# Patient Record
Sex: FEMALE | Race: WHITE
[De-identification: names, ages, dates, MRNs, and addresses within clinical notes are randomized per-mention and may not be internally consistent; named-entity substitution may affect disease eponyms.]

---

## 2017-03-06 ENCOUNTER — HOSPITAL ENCOUNTER (OUTPATIENT)
Dept: HOSPITAL 58 - LAB | Age: 72
Discharge: HOME | End: 2017-03-06
Attending: INTERNAL MEDICINE

## 2017-03-06 VITALS — BODY MASS INDEX: 23.3 KG/M2

## 2017-03-06 DIAGNOSIS — E89.0: Primary | ICD-10-CM

## 2017-03-06 DIAGNOSIS — E89.2: ICD-10-CM

## 2017-03-06 LAB
ALBUMIN SERPL-MCNC: 3.2 G/DL (ref 3.4–5)
ALBUMIN/GLOB SERPL: 0.82 {RATIO}
ALP SERPL-CCNC: 58 U/L (ref 53–141)
ALT SERPL-CCNC: 11 U/L (ref 12–78)
ANION GAP SERPL CALC-SCNC: 17.1 MMOL/L
AST SERPL-CCNC: 14 U/L (ref 15–37)
BILIRUB SERPL-MCNC: 0.27 MG/DL (ref 0–1.2)
BUN SERPL-MCNC: 14 MG/DL (ref 7–18)
BUN/CREAT SERPL: 17.28
CALCIUM SERPL-MCNC: 8.1 MG/DL (ref 8.2–10.2)
CHLORIDE SERPL-SCNC: 97 MMOL/L (ref 98–107)
CO2 BLD-SCNC: 28 MMOL/L (ref 23–31)
CREAT SERPL-MCNC: 0.81 MG/DL (ref 0.6–1.3)
GFR SERPLBLD BASED ON 1.73 SQ M-ARVRAT: 70 ML/MIN
GLOBULIN SER CALC-MCNC: 3.9 G/L
GLUCOSE SERPL-MCNC: 90 MG/DL (ref 82–115)
POTASSIUM SERPL-SCNC: 4.1 MMOL/L (ref 3.5–5.1)
PROT SERPL-MCNC: 7.1 G/DL (ref 5.8–8.1)
SODIUM SERPL-SCNC: 138 MMOL/L (ref 136–145)
VITAMIN D25: 19 NG/ML (ref 20–50)

## 2017-03-06 PROCEDURE — 82306 VITAMIN D 25 HYDROXY: CPT

## 2017-03-06 PROCEDURE — 84443 ASSAY THYROID STIM HORMONE: CPT

## 2017-03-06 PROCEDURE — 36415 COLL VENOUS BLD VENIPUNCTURE: CPT

## 2017-03-06 PROCEDURE — 80053 COMPREHEN METABOLIC PANEL: CPT

## 2017-04-13 ENCOUNTER — HOSPITAL ENCOUNTER (OUTPATIENT)
Dept: HOSPITAL 58 - RAD | Age: 72
Discharge: HOME | End: 2017-04-13
Attending: INTERNAL MEDICINE

## 2017-04-13 VITALS — BODY MASS INDEX: 23.3 KG/M2

## 2017-04-13 DIAGNOSIS — R14.0: Primary | ICD-10-CM

## 2017-04-13 NOTE — CT
EXAM:  CT of the abdomen pelvis without contrast 

  

History:  Abdominal distension. 

  

Comparison:  CT abdomen pelvis 03/26/2015 

  

Technique:  Multiplanar CT images through the abdomen pelvis were obtained without the administratio
n of IV contrast 

  

Findings:  Lung bases are free of consolidation.  No acute osseous abnormalities.  Osteopenia. 

  

Status post cholecystectomy.  Stable small benign hepatic cysts.  Atherosclerotic vascular calcifica
tions.  Calcified granulomas seen within the spleen.  No peripancreatic inflammation.  Adrenal gland
s are unremarkable.  No renal stones and no hydronephrosis.  No ureteral calculi.  No free air and n
o ascites.  No bowel obstruction.  Bladder is not well distended but the wall is not thickened.  The
 appendix is not dilated or inflamed.  Colonic diverticulosis.  No perirectal inflammation.  The lef
t side of the colon is not well distended but there is no pericolonic inflammation.  Atrophic uterus
. 

  

Impression: 

1.  No acute intra-abdominal or pelvic process. 

2.  Colonic diverticulosis. 

3.  Atherosclerotic vascular disease. 

4.  Stable small benign hepatic cysts.

## 2017-04-17 ENCOUNTER — HOSPITAL ENCOUNTER (OUTPATIENT)
Dept: HOSPITAL 58 - RAD | Age: 72
Discharge: HOME | End: 2017-04-17
Attending: INTERNAL MEDICINE
Payer: COMMERCIAL

## 2017-04-17 VITALS — BODY MASS INDEX: 23.3 KG/M2

## 2017-04-17 DIAGNOSIS — Z12.31: Primary | ICD-10-CM

## 2017-04-19 NOTE — MAMMO
EXAM:  Bilateral digital screening mammogram 

  

History:  Screening 

  

Comparison:  Bilateral mammogram 03/23/2015 

  

Findings:  MLO and CC views of bilateral breasts demonstrate scattered fibroglandular breast parench
yma.  Benign bilateral breast calcifications.  There are no dominant masses, no suspicious microcalc
ifications and no architectural distortions 

  

Impression:  Benign stable mammogram.  Recommend followup routine screening mammography in 1 year. 

  

BIRADS 2

## 2017-05-16 ENCOUNTER — HOSPITAL ENCOUNTER (OUTPATIENT)
Dept: HOSPITAL 58 - CAR | Age: 72
Discharge: HOME | End: 2017-05-16
Attending: INTERNAL MEDICINE
Payer: COMMERCIAL

## 2017-05-16 VITALS — BODY MASS INDEX: 23.3 KG/M2

## 2017-05-16 DIAGNOSIS — J44.9: ICD-10-CM

## 2017-05-16 DIAGNOSIS — R06.02: Primary | ICD-10-CM

## 2017-05-16 DIAGNOSIS — Z87.01: ICD-10-CM

## 2017-06-02 ENCOUNTER — OFFICE VISIT (OUTPATIENT)
Dept: GASTROENTEROLOGY | Facility: CLINIC | Age: 72
End: 2017-06-02

## 2017-06-02 VITALS
SYSTOLIC BLOOD PRESSURE: 142 MMHG | HEART RATE: 106 BPM | BODY MASS INDEX: 23.63 KG/M2 | TEMPERATURE: 96.5 F | WEIGHT: 147 LBS | OXYGEN SATURATION: 98 % | DIASTOLIC BLOOD PRESSURE: 78 MMHG | HEIGHT: 66 IN

## 2017-06-02 DIAGNOSIS — Z72.0 TOBACCO USE: ICD-10-CM

## 2017-06-02 DIAGNOSIS — I10 ESSENTIAL HYPERTENSION: ICD-10-CM

## 2017-06-02 DIAGNOSIS — Z83.71 FAMILY HX COLONIC POLYPS: ICD-10-CM

## 2017-06-02 DIAGNOSIS — K62.5 BRBPR (BRIGHT RED BLOOD PER RECTUM): ICD-10-CM

## 2017-06-02 DIAGNOSIS — Z86.010 HX OF COLONIC POLYP: Primary | ICD-10-CM

## 2017-06-02 PROCEDURE — 99213 OFFICE O/P EST LOW 20 MIN: CPT | Performed by: NURSE PRACTITIONER

## 2017-06-02 RX ORDER — OMEPRAZOLE 20 MG/1
20 CAPSULE, DELAYED RELEASE ORAL
COMMUNITY

## 2017-06-02 RX ORDER — ALPRAZOLAM 1 MG/1
1 TABLET ORAL 2 TIMES DAILY PRN
COMMUNITY

## 2017-06-02 RX ORDER — AMLODIPINE BESYLATE 10 MG/1
10 TABLET ORAL
COMMUNITY

## 2017-06-02 RX ORDER — LOSARTAN POTASSIUM AND HYDROCHLOROTHIAZIDE 12.5; 5 MG/1; MG/1
TABLET ORAL
COMMUNITY

## 2017-06-02 RX ORDER — ERGOCALCIFEROL 1.25 MG/1
50000 CAPSULE ORAL WEEKLY
COMMUNITY

## 2017-06-02 RX ORDER — CARVEDILOL 6.25 MG/1
6.25 TABLET ORAL
COMMUNITY

## 2017-06-02 RX ORDER — LEVOTHYROXINE SODIUM 112 UG/1
TABLET ORAL
COMMUNITY

## 2017-06-02 NOTE — PROGRESS NOTES
[facility]    Primary Physician Jose Luis Hirsch MD    6/2/2017    Elle Gómez   1945      Chief Complaint   Patient presents with   • Colon Cancer Screening     Patient is here today for colon recall.       Subjective     HPI    Elle Gómez is a 72 y.o. female with brb pr 1 mo ago.  Had 2 episodes, small amount noted. Has bm qd.         Past Medical History:   Diagnosis Date   • Anemia    • Anxiety    • Arthritis    • Colitis    • Colon polyp    • COPD (chronic obstructive pulmonary disease)    • Disease of thyroid gland    • GI bleed    • Hematochezia    • Hyperlipidemia    • Hypertension    • Peptic ulcer    • Pneumonia        Past Surgical History:   Procedure Laterality Date   • CHOLECYSTECTOMY     • COLONOSCOPY  04/15/2014   • PARATHYROIDECTOMY     • ROTATOR CUFF REPAIR     • THYROIDECTOMY         Outpatient Prescriptions Marked as Taking for the 6/2/17 encounter (Office Visit) with LAISHA Sherman   Medication Sig Dispense Refill   • ALPRAZolam (XANAX) 1 MG tablet Take 1 mg by mouth 2 (Two) Times a Day As Needed for Anxiety.     • amLODIPine (NORVASC) 10 MG tablet Take 10 mg by mouth.     • carvedilol (COREG) 6.25 MG tablet Take 6.25 mg by mouth.     • docusate sodium (COLACE) 50 MG capsule Take  by mouth 2 (Two) Times a Day.     • levothyroxine (SYNTHROID, LEVOTHROID) 112 MCG tablet Take  by mouth.     • losartan-hydrochlorothiazide (HYZAAR) 50-12.5 MG per tablet Take  by mouth.     • omeprazole (priLOSEC) 20 MG capsule Take 20 mg by mouth.     • vitamin D (ERGOCALCIFEROL) 40291 UNITS capsule capsule Take 50,000 Units by mouth 1 (One) Time Per Week.         No Known Allergies    Social History     Social History   • Marital status:      Spouse name: N/A   • Number of children: N/A   • Years of education: N/A     Occupational History   • Not on file.     Social History Main Topics   • Smoking status: Current Every Day Smoker   • Smokeless tobacco: Never  Used   • Alcohol use Yes   • Drug use: Not on file   • Sexual activity: Not on file     Other Topics Concern   • Not on file     Social History Narrative       Family History   Problem Relation Age of Onset   • Colon polyps Mother      doesnt know age   • Colon polyps Sister 68   • Colon cancer Neg Hx        Review of Systems   Constitutional: Negative for appetite change, chills, fatigue, fever and unexpected weight change.   HENT: Negative for sore throat and trouble swallowing.    Respiratory: Positive for shortness of breath (occasional). Negative for cough, chest tightness and wheezing.    Cardiovascular: Negative for chest pain and palpitations.   Gastrointestinal: Positive for anal bleeding and blood in stool. Negative for abdominal distention, abdominal pain, constipation, diarrhea, nausea, rectal pain and vomiting.        As mentioned in hpi   Genitourinary: Negative for difficulty urinating, dysuria and hematuria.   Musculoskeletal: Negative for arthralgias and back pain.   Skin: Negative for color change and rash.   Neurological: Negative for dizziness, tremors, seizures, syncope, light-headedness and headaches.   Hematological: Negative for adenopathy. Does not bruise/bleed easily.   Psychiatric/Behavioral: Negative for confusion. The patient is not nervous/anxious.        Objective     Vitals:    06/02/17 1035   BP: 142/78   Pulse: 106   Temp: 96.5 °F (35.8 °C)   SpO2: 98%     Last 2 weights    06/02/17  1035   Weight: 147 lb (66.7 kg)     Body mass index is 23.73 kg/(m^2).    Physical Exam   Constitutional: She is oriented to person, place, and time. She appears well-developed and well-nourished. No distress.   HENT:   Head: Normocephalic and atraumatic.   Mouth/Throat: Oropharynx is clear and moist.   Eyes: Pupils are equal, round, and reactive to light. No scleral icterus.   Neck: Normal range of motion. Neck supple. No JVD present. No tracheal deviation present.   Cardiovascular: Normal rate,  regular rhythm and intact distal pulses.  Exam reveals no gallop and no friction rub.    Murmur (systolic) heard.  Pulmonary/Chest: Effort normal and breath sounds normal. No stridor. No respiratory distress. She has no wheezes. She has no rales.   Abdominal: Soft. Bowel sounds are normal. She exhibits no distension and no mass. There is no tenderness. There is no rebound and no guarding.   Musculoskeletal: Normal range of motion. She exhibits no edema or deformity.   Neurological: She is alert and oriented to person, place, and time.   Skin: Skin is warm and dry. No rash noted.   Psychiatric: She has a normal mood and affect. Her behavior is normal.   Vitals reviewed.      Imaging Results (most recent)     None          Assessment/Plan     Elle was seen today for colon cancer screening.    Diagnoses and all orders for this visit:    Hx of colonic polyp  -     External Facility Surgical/Procedural Request    BRBPR (bright red blood per rectum)  -     External Facility Surgical/Procedural Request    Family hx colonic polyps  -     External Facility Surgical/Procedural Request    Essential hypertension    Tobacco use    Other orders  -     polyethylene glycol (GOLYTELY) 236 G solution; Take 4,000 mL by mouth 1 (One) Time for 1 dose. Take as directed per instruction sheet.    plan for colonoscopy. Er if  Rectal bleeding.  To take b/p or heart med am of procedure if that is when she normally takes. Encouraged smoking cessation. Will request echo report from morris taylor.      Colonoscopy   All risks, benefits, alternatives, and indications of colonoscopy procedure have been discussed with the patient. Risks to include perforation of the colon requiring possible surgery or colostomy, risk of bleeding from biopsies or removal of colon tissue, possibility of missing a colon polyp or cancer, or adverse drug reaction.  Benefits to include the diagnosis and management of disease of the colon and rectum. Alternatives to include  barium enema, radiographic evaluation, lab testing or no intervention. Pt verbalizes understanding and agrees.     There are no Patient Instructions on file for this visit.    LAISHA Isbell      EMR Dragon/transcription disclaimer:  Much of this encounter note is electronic transcription/translation of spoken language to printed text.  The electronic translation of spoken language may be erroneous, or at times, nonsensical words or phrases may be inadvertently transcribed.  Although I have reviewed the note for such errors, some may still exist.

## 2017-07-11 ENCOUNTER — HOSPITAL ENCOUNTER (OUTPATIENT)
Dept: HOSPITAL 58 - SURG | Age: 72
Discharge: HOME | End: 2017-07-11
Attending: INTERNAL MEDICINE

## 2017-07-11 ENCOUNTER — OUTSIDE FACILITY SERVICE (OUTPATIENT)
Dept: GASTROENTEROLOGY | Facility: CLINIC | Age: 72
End: 2017-07-11

## 2017-07-11 VITALS — TEMPERATURE: 98.5 F | DIASTOLIC BLOOD PRESSURE: 70 MMHG | SYSTOLIC BLOOD PRESSURE: 146 MMHG

## 2017-07-11 VITALS — BODY MASS INDEX: 23.3 KG/M2

## 2017-07-11 DIAGNOSIS — K63.5: ICD-10-CM

## 2017-07-11 DIAGNOSIS — Z86.010: ICD-10-CM

## 2017-07-11 DIAGNOSIS — Z09: Primary | ICD-10-CM

## 2017-07-11 PROCEDURE — 88305 TISSUE EXAM BY PATHOLOGIST: CPT | Performed by: INTERNAL MEDICINE

## 2017-07-11 PROCEDURE — 45385 COLONOSCOPY W/LESION REMOVAL: CPT | Performed by: INTERNAL MEDICINE

## 2017-07-12 ENCOUNTER — LAB REQUISITION (OUTPATIENT)
Dept: LAB | Facility: HOSPITAL | Age: 72
End: 2017-07-12

## 2017-07-12 DIAGNOSIS — Z00.00 ENCOUNTER FOR GENERAL ADULT MEDICAL EXAMINATION WITHOUT ABNORMAL FINDINGS: ICD-10-CM

## 2017-07-12 NOTE — OP
INDICATIONS FOR PROCEDURE: 72 year old female presents for colonoscopy exam. 
She had abnormal polyps removed three years ago approximately. She presents for 
her followup surveillance exam. 



MEDICATIONS:  SEE ANESTHESIA NOTES.



PROCEDURE:  COLONOSCOPY, SNARE POLYPECTOMY.



REPORT:  The risks, benefits, alternatives and limitations were discussed in 
detail with the patient.  Informed consent was obtained.



After adequate sedation was achieved, a digital rectal exam revealed good tone, 
no masses.  The colonoscope was introduced into the rectum and advanced under 
direct visual guidance to the cecum.  The cecum was identified by the 
appendiceal orifice and IC valve. The then slowly withdrew the scope in a 
circumferential manner and examined the mucosa quite carefully. I looked on the 
proximal and distal side of the folds and flexures as best as possible. I sent 
the retroflex scope in the right and left colon in the increase visualization. 
Colonic mucosa is unremarkable all the way down to the rectum here there is a 5-
6mm sessile polyp. I removed this by snare technique. No other abnormalities 
were noted. The prep was adequate and the withdraw time was 9 minute and 20 
seconds. The patient tolerated the procedure well with stable vital signs and 
pulse oximetry throughout.



IMPRESSION:

1. Small rectal polyp removed



RECOMMENDATIONS: 

1. High fiber diet

2. Office visit as needed

3. Await polyp pathology and if everything is benign as expected I recommend 
repeat surveillance exam again in five year or sooner if there are any signs or 
symptoms to indicate otherwise. 



CC: Dr. Jose WILLIAM

## 2017-07-13 LAB
LAB AP CASE REPORT: NORMAL
LAB AP CLINICAL INFORMATION: NORMAL
Lab: NORMAL
PATH REPORT.FINAL DX SPEC: NORMAL
PATH REPORT.GROSS SPEC: NORMAL

## 2017-08-01 ENCOUNTER — TRANSCRIBE ORDERS (OUTPATIENT)
Dept: ADMINISTRATIVE | Facility: HOSPITAL | Age: 72
End: 2017-08-01

## 2017-08-01 DIAGNOSIS — N95.0 POST-MENOPAUSAL BLEEDING: Primary | ICD-10-CM

## 2017-08-01 PROCEDURE — G0123 SCREEN CERV/VAG THIN LAYER: HCPCS | Performed by: OBSTETRICS & GYNECOLOGY

## 2017-08-02 ENCOUNTER — LAB REQUISITION (OUTPATIENT)
Dept: LAB | Facility: HOSPITAL | Age: 72
End: 2017-08-02

## 2017-08-02 DIAGNOSIS — Z12.72 ENCOUNTER FOR SCREENING FOR MALIGNANT NEOPLASM OF VAGINA: ICD-10-CM

## 2017-08-03 ENCOUNTER — HOSPITAL ENCOUNTER (OUTPATIENT)
Dept: ULTRASOUND IMAGING | Facility: HOSPITAL | Age: 72
Discharge: HOME | End: 2017-08-03
Attending: OBSTETRICS & GYNECOLOGY | Admitting: OBSTETRICS & GYNECOLOGY

## 2017-08-03 DIAGNOSIS — N95.0 POST-MENOPAUSAL BLEEDING: ICD-10-CM

## 2017-08-03 LAB
GEN CATEG CVX/VAG CYTO-IMP: NORMAL
LAB AP CASE REPORT: NORMAL
LAB AP GYN ADDITIONAL INFORMATION: NORMAL
LAB AP GYN OTHER FINDINGS: NORMAL
Lab: NORMAL
PATH INTERP SPEC-IMP: NORMAL
STAT OF ADQ CVX/VAG CYTO-IMP: NORMAL

## 2017-08-03 PROCEDURE — 76830 TRANSVAGINAL US NON-OB: CPT

## 2017-09-05 ENCOUNTER — HOSPITAL ENCOUNTER (OUTPATIENT)
Dept: HOSPITAL 58 - LAB | Age: 72
Discharge: HOME | End: 2017-09-05
Attending: INTERNAL MEDICINE

## 2017-09-05 VITALS — BODY MASS INDEX: 23.3 KG/M2

## 2017-09-05 DIAGNOSIS — E89.0: ICD-10-CM

## 2017-09-05 DIAGNOSIS — E89.2: Primary | ICD-10-CM

## 2017-09-05 LAB
ALBUMIN SERPL-MCNC: 3.5 G/DL (ref 3.4–5)
ALBUMIN/GLOB SERPL: 1.17 {RATIO}
ALP SERPL-CCNC: 62 U/L (ref 53–141)
ALT SERPL-CCNC: 9 U/L (ref 12–78)
ANION GAP SERPL CALC-SCNC: 14.8 MMOL/L
AST SERPL-CCNC: 15 U/L (ref 15–37)
BILIRUB SERPL-MCNC: 0.23 MG/DL (ref 0–1.2)
BUN SERPL-MCNC: 10 MG/DL (ref 7–18)
BUN/CREAT SERPL: 12.04
CALCIUM SERPL-MCNC: 8.9 MG/DL (ref 8.2–10.2)
CHLORIDE SERPL-SCNC: 102 MMOL/L (ref 98–107)
CO2 BLD-SCNC: 28 MMOL/L (ref 23–31)
CREAT SERPL-MCNC: 0.83 MG/DL (ref 0.6–1.3)
GFR SERPLBLD BASED ON 1.73 SQ M-ARVRAT: 68 ML/MIN
GLOBULIN SER CALC-MCNC: 3 G/L
GLUCOSE SERPL-MCNC: 99 MG/DL (ref 82–115)
POTASSIUM SERPL-SCNC: 4.8 MMOL/L (ref 3.5–5.1)
PROT SERPL-MCNC: 6.5 G/DL (ref 5.8–8.1)
SODIUM SERPL-SCNC: 140 MMOL/L (ref 136–145)

## 2017-09-05 PROCEDURE — 84443 ASSAY THYROID STIM HORMONE: CPT

## 2017-09-05 PROCEDURE — 80053 COMPREHEN METABOLIC PANEL: CPT

## 2017-09-05 PROCEDURE — 36415 COLL VENOUS BLD VENIPUNCTURE: CPT

## 2018-03-23 ENCOUNTER — HOSPITAL ENCOUNTER (OUTPATIENT)
Dept: HOSPITAL 58 - LAB | Age: 73
Discharge: HOME | End: 2018-03-23
Attending: INTERNAL MEDICINE

## 2018-03-23 VITALS — BODY MASS INDEX: 23.3 KG/M2

## 2018-03-23 DIAGNOSIS — E55.9: Primary | ICD-10-CM

## 2018-03-23 DIAGNOSIS — D64.9: ICD-10-CM

## 2018-03-23 DIAGNOSIS — E03.9: ICD-10-CM

## 2018-03-23 DIAGNOSIS — E20.9: ICD-10-CM

## 2018-03-23 PROCEDURE — 36415 COLL VENOUS BLD VENIPUNCTURE: CPT

## 2018-03-23 PROCEDURE — 83970 ASSAY OF PARATHORMONE: CPT

## 2018-03-23 PROCEDURE — 85025 COMPLETE CBC W/AUTO DIFF WBC: CPT

## 2018-03-23 PROCEDURE — 84443 ASSAY THYROID STIM HORMONE: CPT

## 2018-03-23 PROCEDURE — 84100 ASSAY OF PHOSPHORUS: CPT

## 2018-03-23 PROCEDURE — 80053 COMPREHEN METABOLIC PANEL: CPT

## 2018-03-23 PROCEDURE — 82306 VITAMIN D 25 HYDROXY: CPT

## 2018-03-23 PROCEDURE — 84439 ASSAY OF FREE THYROXINE: CPT

## 2018-03-23 PROCEDURE — 83735 ASSAY OF MAGNESIUM: CPT

## 2018-06-01 ENCOUNTER — HOSPITAL ENCOUNTER (OUTPATIENT)
Dept: HOSPITAL 58 - RAD | Age: 73
Discharge: HOME | End: 2018-06-01
Attending: INTERNAL MEDICINE

## 2018-06-01 VITALS — BODY MASS INDEX: 23.3 KG/M2

## 2018-06-01 DIAGNOSIS — Z12.31: Primary | ICD-10-CM

## 2018-06-01 PROCEDURE — 77067 SCR MAMMO BI INCL CAD: CPT

## 2018-06-04 NOTE — MAMMO
EXAM:  Digital screening mammogram with tomosynthesis 

  

HISTORY:  Screening 

  

COMPARISON:  04/27/2017 

  

FINDINGS:  Digital  MLO and CC views of the right and left breast were performed. Tomosynthesis was p
erformed.  Computer aided detection utilized. There are scattered fibroglandular densities. Benign bi
lateral calcifications. There is no evidence for mass, asymmetry, distortion, or suspicious calcifica
tions in either breast. 

  

IMPRESSION: 

1.  No evidence of malignancy in the right or left breast. 

2.  Annual screening mammogram is recommended in one year. 

  

BIRADS category 2, benign

## 2018-09-12 ENCOUNTER — HOSPITAL ENCOUNTER (OUTPATIENT)
Dept: HOSPITAL 58 - RAD | Age: 73
Discharge: HOME | End: 2018-09-12
Attending: INTERNAL MEDICINE

## 2018-09-12 VITALS — BODY MASS INDEX: 23.3 KG/M2

## 2018-09-12 DIAGNOSIS — N28.89: Primary | ICD-10-CM

## 2018-09-12 PROCEDURE — 74178 CT ABD&PLV WO CNTR FLWD CNTR: CPT

## 2018-09-12 NOTE — CT
EXAM:  CT abdomen and pelvis without/with contrast 

  

HISTORY: Right renal cortical mass 

  

TECHNIQUE:  Multi-slice transaxial helical with coronal and sagittal reformed images 

  

CONTRAST: Intravenous Omnipaque-350, 100 mL 

  

COMPARISON: CT abdomen/pelvis from 04/13/2017 and CT chest from 09/06/2018 

  

FINDINGS:  There are tree-in-bud alveolar opacities in the lower lungs.  The heart size is normal.  N
o pericardial or pleural effusions are appreciated. 

  

A simple cyst is detected hepatic segment II.  A tiny simple cyst is detected in the right hepatic lo
be at its caudad aspect.  The gallbladder surgically absent without significant biliary dilatation.  
Pancreatic divisum is noted.  The adrenal glands have normal attenuation and morphology.  The spleen 
has normal size with multiple calcified granulomas. 

  

Small simple left renal cysts are detected.  A few tiny hypodensities in the right kidney are also no
north.  Some of these hypodensities are too small to characterize there is discoid most compatible with
 simple cysts.  There is a small exophytic mass at the right renal cortex lateral aspect measuring 8.
2 x 8.0 x 5.8 mm. This has enhancement with washout.  The ureters have normal caliber.  No nephrolith
iasis or ureterolithiasis are appreciated.  The bladder is empty and cannot be assessed. Uterus adnex
a are normal. 

  

Diverticula arise from large bowel without CT evidence of diverticulitis.  The abdominal aorta is ath
erosclerotic.  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
  The inferior mesenteric artery remains patent.  Solitary renal arteries perfuse the kidneys.  No re
nal artery aneurysms are appreciated.  No renal vein invasion is evident.  No lymphadenopathy or asci
john are detected. 

  

The bones are free of suspicious osteolytic or osteoblastic lesions. 

  

IMPRESSION: 

  

1.  Enhancing mass at the interpolar region of the right kidney laterally is suspicious for renal juan
l carcinoma.  Urology consultation recommended. 

2.  ASCVD. 

3.  Simple hepatic cysts. 

4.  Colonic diverticulosis without CT evidence of diverticulitis. 

5.  Pancreatic divisum. 

6.  Tree-in-bud alveolar opacities suggest pneumonia.

## 2018-10-01 ENCOUNTER — OFFICE VISIT (OUTPATIENT)
Dept: UROLOGY | Age: 73
End: 2018-10-01
Payer: MEDICARE

## 2018-10-01 VITALS — BODY MASS INDEX: 21.35 KG/M2 | TEMPERATURE: 98.7 F | HEIGHT: 67 IN | WEIGHT: 136 LBS

## 2018-10-01 DIAGNOSIS — N28.89 RIGHT RENAL MASS: Primary | ICD-10-CM

## 2018-10-01 LAB
BILIRUBIN, POC: 0
BLOOD URINE, POC: NORMAL
CLARITY, POC: CLEAR
COLOR, POC: YELLOW
GLUCOSE URINE, POC: NORMAL
KETONES, POC: NORMAL
LEUKOCYTE EST, POC: NORMAL
NITRITE, POC: NORMAL
PH, POC: 7.5
PROTEIN, POC: NORMAL
SPECIFIC GRAVITY, POC: 1.01
UROBILINOGEN, POC: 0.2

## 2018-10-01 PROCEDURE — G8427 DOCREV CUR MEDS BY ELIG CLIN: HCPCS | Performed by: UROLOGY

## 2018-10-01 PROCEDURE — G8420 CALC BMI NORM PARAMETERS: HCPCS | Performed by: UROLOGY

## 2018-10-01 PROCEDURE — 3017F COLORECTAL CA SCREEN DOC REV: CPT | Performed by: UROLOGY

## 2018-10-01 PROCEDURE — 1090F PRES/ABSN URINE INCON ASSESS: CPT | Performed by: UROLOGY

## 2018-10-01 PROCEDURE — G8400 PT W/DXA NO RESULTS DOC: HCPCS | Performed by: UROLOGY

## 2018-10-01 PROCEDURE — 81003 URINALYSIS AUTO W/O SCOPE: CPT | Performed by: UROLOGY

## 2018-10-01 PROCEDURE — G8484 FLU IMMUNIZE NO ADMIN: HCPCS | Performed by: UROLOGY

## 2018-10-01 PROCEDURE — 1101F PT FALLS ASSESS-DOCD LE1/YR: CPT | Performed by: UROLOGY

## 2018-10-01 PROCEDURE — 99204 OFFICE O/P NEW MOD 45 MIN: CPT | Performed by: UROLOGY

## 2018-10-01 PROCEDURE — 4004F PT TOBACCO SCREEN RCVD TLK: CPT | Performed by: UROLOGY

## 2018-10-01 PROCEDURE — 4040F PNEUMOC VAC/ADMIN/RCVD: CPT | Performed by: UROLOGY

## 2018-10-01 PROCEDURE — 1123F ACP DISCUSS/DSCN MKR DOCD: CPT | Performed by: UROLOGY

## 2018-10-01 RX ORDER — ALPRAZOLAM 0.5 MG/1
0.5 TABLET ORAL NIGHTLY PRN
COMMUNITY

## 2018-10-01 RX ORDER — AMLODIPINE BESYLATE 10 MG/1
10 TABLET ORAL DAILY
COMMUNITY

## 2018-10-01 RX ORDER — OMEPRAZOLE 10 MG/1
10 CAPSULE, DELAYED RELEASE ORAL DAILY
COMMUNITY

## 2018-10-01 RX ORDER — ATORVASTATIN CALCIUM 20 MG/1
20 TABLET, FILM COATED ORAL DAILY
COMMUNITY

## 2018-10-01 RX ORDER — LEVOTHYROXINE SODIUM 112 UG/1
112 TABLET ORAL DAILY
COMMUNITY

## 2018-10-01 RX ORDER — CARVEDILOL 6.25 MG/1
6.25 TABLET ORAL 2 TIMES DAILY WITH MEALS
COMMUNITY

## 2018-10-01 RX ORDER — LOSARTAN POTASSIUM AND HYDROCHLOROTHIAZIDE 12.5; 5 MG/1; MG/1
1 TABLET ORAL DAILY
COMMUNITY

## 2018-10-01 RX ORDER — ACETAMINOPHEN 160 MG
TABLET,DISINTEGRATING ORAL
COMMUNITY

## 2018-10-01 RX ORDER — MECLIZINE HCL 12.5 MG/1
12.5 TABLET ORAL 3 TIMES DAILY PRN
COMMUNITY

## 2018-10-01 ASSESSMENT — ENCOUNTER SYMPTOMS
SHORTNESS OF BREATH: 0
NAUSEA: 0
BLURRED VISION: 0
WHEEZING: 0
HEARTBURN: 0
SORE THROAT: 0
DOUBLE VISION: 0

## 2018-10-01 NOTE — PROGRESS NOTES
112 mcg by mouth Daily      amLODIPine (NORVASC) 10 MG tablet Take 10 mg by mouth daily       No current facility-administered medications for this visit. No Known Allergies    Social History     Social History    Marital status: Single     Spouse name: N/A    Number of children: N/A    Years of education: N/A     Social History Main Topics    Smoking status: Current Every Day Smoker    Smokeless tobacco: Never Used    Alcohol use No    Drug use: No    Sexual activity: Not Asked     Other Topics Concern    None     Social History Narrative    None       History reviewed. No pertinent family history. REVIEW OF SYSTEMS:  Review of Systems   Constitutional: Negative for chills and fever. HENT: Negative for congestion and sore throat. Eyes: Negative for blurred vision and double vision. Respiratory: Negative for shortness of breath and wheezing. Cardiovascular: Negative for chest pain and palpitations. Gastrointestinal: Negative for heartburn and nausea. Genitourinary: Negative for dysuria, flank pain, frequency, hematuria and urgency. Musculoskeletal: Negative for falls and neck pain. Skin: Negative for itching and rash. Neurological: Negative for dizziness and tingling. Endo/Heme/Allergies: Does not bruise/bleed easily. Psychiatric/Behavioral: The patient is nervous/anxious. The patient does not have insomnia. PHYSICAL EXAM:  Temp 98.7 °F (37.1 °C) (Temporal)   Ht 5' 7\" (1.702 m)   Wt 136 lb (61.7 kg)   BMI 21.30 kg/m²   Physical Exam   Constitutional: She is oriented to person, place, and time. She appears well-developed and well-nourished. HENT:   Head: Normocephalic and atraumatic. Eyes: Pupils are equal, round, and reactive to light. Conjunctivae and EOM are normal. No scleral icterus. Neck: Normal range of motion. Neck supple. Cardiovascular: Normal rate, regular rhythm and intact distal pulses.     Pulmonary/Chest: Effort normal and breath sounds normal. No respiratory distress. She exhibits no tenderness. Abdominal: Soft. She exhibits no distension and no mass. There is no tenderness. Musculoskeletal: Normal range of motion. She exhibits no edema or tenderness. Lymphadenopathy:     She has no cervical adenopathy. Neurological: She is alert and oriented to person, place, and time. Skin: Skin is warm and dry. Psychiatric: She has a normal mood and affect. Her behavior is normal.   Vitals reviewed. DATA:    Results for orders placed or performed in visit on 10/01/18   POCT Urinalysis No Micro (Auto)   Result Value Ref Range    Color, UA yellow     Clarity, UA clear     Glucose, UA POC neg     Bilirubin, UA 0     Ketones, UA neg     Spec Grav, UA 1.015     Blood, UA POC neg     pH, UA 7.5     Protein, UA POC neg     Urobilinogen, UA 0.2     Leukocytes, UA neg     Nitrite, UA neg        IMAGING:   CT scan of the abdomen with and without contrast done on 09/12/18 shows an 8 mm enhancing mass    1. Right renal mass  Given this is less than 1 imaging our recommend watchful waiting with repeat serial imaging at least every 6 months. She states she is continuing to move [place] to be closer to her children gave her the disc so she would have is available for follow-up with a local urologist when she can be established otherwise if she chooses to be happy see her back here in 6 months for follow-up  - POCT Urinalysis No Micro (Auto)  - CT ABDOMEN PELVIS W WO CONTRAST Additional Contrast? Radiologist Recommendation (renal mass protocol);  Future      Orders Placed This Encounter   Procedures    CT ABDOMEN PELVIS W WO CONTRAST Additional Contrast? Radiologist Recommendation (renal mass protocol)     Renal mass protocol     Standing Status:   Future     Standing Expiration Date:   10/1/2019     Order Specific Question:   Additional Contrast?     Answer:   Radiologist Recommendation     Comments:   renal mass protocol     Order Specific Question: